# Patient Record
Sex: MALE | Race: BLACK OR AFRICAN AMERICAN | NOT HISPANIC OR LATINO | URBAN - METROPOLITAN AREA
[De-identification: names, ages, dates, MRNs, and addresses within clinical notes are randomized per-mention and may not be internally consistent; named-entity substitution may affect disease eponyms.]

---

## 2024-05-08 NOTE — PATIENT INSTRUCTIONS
It was nice meeting you today.    Please go to [facility] lab or another [facility] lab facility to complete the lab testing that I ordered     Please call the referral line: I have ordered a referral to dermatology for the skin tags on your neck.  You can also call to schedule to be seen at [facility] if they take your insurance; it can be faster to seen at [facility].     I recommend receiving the TDAP booster that prevents tetanus and other infectious disease if you have not received in the past 10 years    I recommend the updated COVID vaccine that can be obtained at your local pharmacy    Please schedule a follow up with me in 4  weeks to discuss and review your test results

## 2024-05-08 NOTE — PROGRESS NOTES
"Subjective   Kiran Bentley is a 19 y.o. male who presents for NPV TO ESTABLISH PCP and FOR PHYSICAL.    HPI:      18 yo male NEVER SMOKER who presents for NPV TO ESTABLISH PCP and FOR PHYSICAL.       [facility] records reviewed. Limited records available for review.    PMHx:  cannabis    Healthcare Providers:  none    Preventive Health Services:  -Last physical:  4+ years ago  -last colonoscopy: NEVER; DENIES FHx colon cancer==> DUE at Age 42 yo  -last STI screening: ?  -Hep C screening: ?      Immunizations:   -Childhood vaccines: completed per patient    -COVID vaccine and booster: did not receive  -updated COVID spike vaccine: NOW DUE  -TDAP: ?  NOW DUE        There is no immunization history on file for this patient.      Today Kiran  reports:    - doing and feeling well.       Today he has no other reported complaints, issues, or problems.    ROS is NEG for HEADACHE, NAUSEA, VOMITING, DIARRHEA, CHEST PAIN, SOB, and BLEEDING.  Review of systems (10+) is negative for all systems except for any identified issues in HPI above.      Sexually active with 1 female partner x 4 years.  Denies history STIs      SHx:  -lives with: alone and cat  -employment: [employer]  -tobacco use: NEVER   -alcohol use: 1 glass every 2 weeks  -illicits: smokes cannabis      FHx:  Cancer:   -breast cancer: maternal GM (passed at 83 of breast cancer)  HTN: DENIES  DM: DENIES  Heart Disease: DENIES  Stroke: DENIES  Thyroid Disease: DENIES        Objective     /82   Pulse 79   Temp 36.9 °C (98.4 °F)   Resp 18   Ht 1.905 m (6' 3\")   Wt 79.8 kg (176 lb)   SpO2 96%   BMI 22.00 kg/m²      Physical Examination:       GENERAL           General Appearance: pleasant, well-appearing, well-developed, well-hydrated, well-nourished, well-groomed.        HEENT           NECK supple, Neg for adneopathy no thyroid enlargement or nodules, Oropharynx normal no exudates. Ears: TMs intact, normal, no effusions, no signs of infection, no cerumen or " debris in ear canals       EYES           Pupils: PERRLA, no photophobia.        HEART           Rate and Rhythm regular rate and rhythm. Heart sounds: normal S1S2. Murmurs: none.        LUNGS           Effort: Normal chest wall, no pectus, Normal air entry all fields, Clear to IPPA, RR<16 with no use of accessory muscles.        BACK           General: unremarkable, no spinal tenderness or rashes.        ABDOMEN           General: Normal to inspection, neg for LKKS or masses and BSs heard in all quadrants                  LYMPHATICS           Cervical: none. Axillary: none.        MUSCULOSKELETAL           gross abnormalities no gross abnormalities, no joint redness or swelling.        EXTREMITIES           Varicose veins: not present. Pulses: 2+ bilateral. Clubbing: none. Cyanosis: no.        NEUROLOGICAL           Orientation: alert and oriented x 3. Grossly normal: yes. Plantars: downgoing bilaterally. Muscle Bulk: normal . Cranial Nerves: CN's II-XII grossly intact.        PSYCHOLOGY           Affect: appropriate. Mood: pleasant.        (patient declined chaperone): penis: normal, circumcised phallus. No lesions. Scrotum: normal: no lesions. Testicles: descended bilaterally, non-tender to palpation, no palpable masses or hernias. No inguinal LAD.      SKIN: small skin tags on anterior neck. No rashes. No lesions      Assessment/Plan   Problem List Items Addressed This Visit    None  Visit Diagnoses       Physical exam    -  Primary    Encounter to establish care        Lipid screening        Diabetes mellitus screening        Vitamin D deficiency        Encounter for hepatitis C screening test for low risk patient        Routine screening for STI (sexually transmitted infection)        Immunization counseling              Establish PCP care and Physical  -labs ordered (see A/P above for details)    Skin Tags on anterior neck:  -referral to dermatology ordered    Lipid Disorder screening  -lipid panel  ordered    Diabetes Screening  -HgBA1c ordered    Vitamin D deficiency  -Vit D levels ordered     Hep C screening  -Hep C antibody ordered     STI Screening:  -HIV, syphilis, GC/CT, trich ordered       Counseling:       Medication education:         Education:  The patient is counseled regarding potential side-effects of all new medications        Understanding:  Patient expressed understanding        Adherence:  No barriers to adherence identified        Immunizations Counseling  -TDAP ? now due==> patient declined today. Patient advised to bring in his immunization records to follow up visit  -recommend updated COVID spike vaccine that can be obtained at local pharmacy     FOLLOW-UP: 4 weeks to discuss and review test results and 8 weeks for PHYSICAL     Discussed recommended plan of care with patient. Patient expressed understanding and agreement with plan of care. All of patient's questions were answered at the time. Patient had no additional questions at the time.           Orquidea Craig M.D.

## 2024-05-09 ENCOUNTER — OFFICE VISIT (OUTPATIENT)
Dept: PRIMARY CARE | Facility: CLINIC | Age: 19
End: 2024-05-09

## 2024-05-09 VITALS
OXYGEN SATURATION: 96 % | HEART RATE: 79 BPM | BODY MASS INDEX: 21.88 KG/M2 | DIASTOLIC BLOOD PRESSURE: 82 MMHG | RESPIRATION RATE: 18 BRPM | HEIGHT: 75 IN | SYSTOLIC BLOOD PRESSURE: 134 MMHG | TEMPERATURE: 98.4 F | WEIGHT: 176 LBS

## 2024-05-09 DIAGNOSIS — Z71.85 IMMUNIZATION COUNSELING: ICD-10-CM

## 2024-05-09 DIAGNOSIS — Z11.59 ENCOUNTER FOR HEPATITIS C SCREENING TEST FOR LOW RISK PATIENT: ICD-10-CM

## 2024-05-09 DIAGNOSIS — Z11.3 ROUTINE SCREENING FOR STI (SEXUALLY TRANSMITTED INFECTION): ICD-10-CM

## 2024-05-09 DIAGNOSIS — Z00.00 PHYSICAL EXAM: Primary | ICD-10-CM

## 2024-05-09 DIAGNOSIS — Z13.220 LIPID SCREENING: ICD-10-CM

## 2024-05-09 DIAGNOSIS — Z76.89 ENCOUNTER TO ESTABLISH CARE: ICD-10-CM

## 2024-05-09 DIAGNOSIS — E55.9 VITAMIN D DEFICIENCY: ICD-10-CM

## 2024-05-09 DIAGNOSIS — Z13.1 DIABETES MELLITUS SCREENING: ICD-10-CM

## 2024-05-09 DIAGNOSIS — L91.8 SKIN TAG: ICD-10-CM

## 2024-05-09 PROCEDURE — 99385 PREV VISIT NEW AGE 18-39: CPT | Performed by: FAMILY MEDICINE

## 2024-05-09 PROCEDURE — 4004F PT TOBACCO SCREEN RCVD TLK: CPT | Performed by: FAMILY MEDICINE

## 2024-05-09 ASSESSMENT — ENCOUNTER SYMPTOMS
DEPRESSION: 0
LOSS OF SENSATION IN FEET: 0
OCCASIONAL FEELINGS OF UNSTEADINESS: 0

## 2024-05-09 ASSESSMENT — PAIN SCALES - GENERAL: PAINLEVEL: 0-NO PAIN

## 2024-05-09 ASSESSMENT — COLUMBIA-SUICIDE SEVERITY RATING SCALE - C-SSRS
6. HAVE YOU EVER DONE ANYTHING, STARTED TO DO ANYTHING, OR PREPARED TO DO ANYTHING TO END YOUR LIFE?: NO
1. IN THE PAST MONTH, HAVE YOU WISHED YOU WERE DEAD OR WISHED YOU COULD GO TO SLEEP AND NOT WAKE UP?: NO
2. HAVE YOU ACTUALLY HAD ANY THOUGHTS OF KILLING YOURSELF?: NO

## 2024-05-09 ASSESSMENT — PATIENT HEALTH QUESTIONNAIRE - PHQ9
SUM OF ALL RESPONSES TO PHQ9 QUESTIONS 1 AND 2: 0
2. FEELING DOWN, DEPRESSED OR HOPELESS: NOT AT ALL
1. LITTLE INTEREST OR PLEASURE IN DOING THINGS: NOT AT ALL